# Patient Record
Sex: FEMALE | NOT HISPANIC OR LATINO | Employment: OTHER | URBAN - METROPOLITAN AREA
[De-identification: names, ages, dates, MRNs, and addresses within clinical notes are randomized per-mention and may not be internally consistent; named-entity substitution may affect disease eponyms.]

---

## 2019-06-25 ENCOUNTER — APPOINTMENT (OUTPATIENT)
Dept: PREADMISSION TESTING | Facility: HOSPITAL | Age: 79
End: 2019-06-25

## 2019-06-25 ENCOUNTER — ANESTHESIA EVENT (OUTPATIENT)
Dept: PERIOP | Facility: HOSPITAL | Age: 79
End: 2019-06-25

## 2019-06-25 VITALS — BODY MASS INDEX: 22.46 KG/M2 | WEIGHT: 114.42 LBS | HEIGHT: 60 IN

## 2019-06-25 LAB
DEPRECATED RDW RBC AUTO: 40.4 FL (ref 37–54)
ERYTHROCYTE [DISTWIDTH] IN BLOOD BY AUTOMATED COUNT: 12 % (ref 12.3–15.4)
HBA1C MFR BLD: 5.6 % (ref 4.8–5.6)
HCT VFR BLD AUTO: 38.2 % (ref 34–46.6)
HGB BLD-MCNC: 12.9 G/DL (ref 12–15.9)
MCH RBC QN AUTO: 31.2 PG (ref 26.6–33)
MCHC RBC AUTO-ENTMCNC: 33.8 G/DL (ref 31.5–35.7)
MCV RBC AUTO: 92.3 FL (ref 79–97)
PLATELET # BLD AUTO: 204 10*3/MM3 (ref 140–450)
PMV BLD AUTO: 9.3 FL (ref 6–12)
POTASSIUM BLD-SCNC: 3.7 MMOL/L (ref 3.5–5.2)
RBC # BLD AUTO: 4.14 10*6/MM3 (ref 3.77–5.28)
WBC NRBC COR # BLD: 5.27 10*3/MM3 (ref 3.4–10.8)

## 2019-06-25 PROCEDURE — 84132 ASSAY OF SERUM POTASSIUM: CPT | Performed by: ANESTHESIOLOGY

## 2019-06-25 PROCEDURE — 93005 ELECTROCARDIOGRAM TRACING: CPT

## 2019-06-25 PROCEDURE — 83036 HEMOGLOBIN GLYCOSYLATED A1C: CPT | Performed by: ANESTHESIOLOGY

## 2019-06-25 PROCEDURE — 36415 COLL VENOUS BLD VENIPUNCTURE: CPT

## 2019-06-25 PROCEDURE — 85027 COMPLETE CBC AUTOMATED: CPT | Performed by: ANESTHESIOLOGY

## 2019-06-25 RX ORDER — FAMOTIDINE 10 MG/ML
20 INJECTION, SOLUTION INTRAVENOUS ONCE
Status: CANCELLED | OUTPATIENT
Start: 2019-06-25 | End: 2019-06-25

## 2019-06-25 RX ORDER — LORATADINE 10 MG/1
CAPSULE, LIQUID FILLED ORAL DAILY
COMMUNITY

## 2019-06-25 RX ORDER — GABAPENTIN 300 MG/1
300 CAPSULE ORAL 3 TIMES DAILY
COMMUNITY

## 2019-06-25 RX ORDER — OMEPRAZOLE 20 MG/1
20 CAPSULE, DELAYED RELEASE ORAL DAILY PRN
COMMUNITY

## 2019-06-25 RX ORDER — HYDROCHLOROTHIAZIDE 12.5 MG/1
12.5 TABLET ORAL DAILY
COMMUNITY

## 2019-06-26 ENCOUNTER — APPOINTMENT (OUTPATIENT)
Dept: GENERAL RADIOLOGY | Facility: HOSPITAL | Age: 79
End: 2019-06-26

## 2019-06-26 ENCOUNTER — ANESTHESIA (OUTPATIENT)
Dept: PERIOP | Facility: HOSPITAL | Age: 79
End: 2019-06-26

## 2019-06-26 ENCOUNTER — HOSPITAL ENCOUNTER (INPATIENT)
Facility: HOSPITAL | Age: 79
LOS: 3 days | Discharge: HOME HEALTH | End: 2019-06-29
Attending: ORTHOPAEDIC SURGERY | Admitting: ORTHOPAEDIC SURGERY

## 2019-06-26 DIAGNOSIS — Z74.09 IMPAIRED MOBILITY AND ADLS: ICD-10-CM

## 2019-06-26 DIAGNOSIS — Z74.09 IMPAIRED FUNCTIONAL MOBILITY, BALANCE, GAIT, AND ENDURANCE: Primary | ICD-10-CM

## 2019-06-26 DIAGNOSIS — Z78.9 IMPAIRED MOBILITY AND ADLS: ICD-10-CM

## 2019-06-26 DIAGNOSIS — Z98.1 S/P LUMBAR FUSION: ICD-10-CM

## 2019-06-26 PROBLEM — E78.5 HLD (HYPERLIPIDEMIA): Status: ACTIVE | Noted: 2019-06-26

## 2019-06-26 PROBLEM — I10 HTN (HYPERTENSION): Status: ACTIVE | Noted: 2019-06-26

## 2019-06-26 PROBLEM — M54.9 BACK PAIN: Status: ACTIVE | Noted: 2019-06-26

## 2019-06-26 PROCEDURE — 25010000003 CEFAZOLIN IN DEXTROSE 2-4 GM/100ML-% SOLUTION: Performed by: ORTHOPAEDIC SURGERY

## 2019-06-26 PROCEDURE — 25810000003 SODIUM CHLORIDE 0.9 % WITH KCL 20 MEQ 20-0.9 MEQ/L-% SOLUTION: Performed by: ORTHOPAEDIC SURGERY

## 2019-06-26 PROCEDURE — 25010000002 ONDANSETRON PER 1 MG: Performed by: ORTHOPAEDIC SURGERY

## 2019-06-26 PROCEDURE — C1713 ANCHOR/SCREW BN/BN,TIS/BN: HCPCS | Performed by: ORTHOPAEDIC SURGERY

## 2019-06-26 PROCEDURE — 25010000002 ONDANSETRON PER 1 MG: Performed by: NURSE ANESTHETIST, CERTIFIED REGISTERED

## 2019-06-26 PROCEDURE — 25010000002 NEOSTIGMINE 10 MG/10ML SOLUTION: Performed by: NURSE ANESTHETIST, CERTIFIED REGISTERED

## 2019-06-26 PROCEDURE — 25010000002 FENTANYL CITRATE (PF) 100 MCG/2ML SOLUTION: Performed by: NURSE ANESTHETIST, CERTIFIED REGISTERED

## 2019-06-26 PROCEDURE — 25010000002 PROPOFOL 10 MG/ML EMULSION: Performed by: NURSE ANESTHETIST, CERTIFIED REGISTERED

## 2019-06-26 PROCEDURE — 0SG0071 FUSION OF LUMBAR VERTEBRAL JOINT WITH AUTOLOGOUS TISSUE SUBSTITUTE, POSTERIOR APPROACH, POSTERIOR COLUMN, OPEN APPROACH: ICD-10-PCS | Performed by: ORTHOPAEDIC SURGERY

## 2019-06-26 PROCEDURE — 76000 FLUOROSCOPY <1 HR PHYS/QHP: CPT

## 2019-06-26 PROCEDURE — 25010000002 HYDROMORPHONE 1 MG/ML SOLUTION: Performed by: ORTHOPAEDIC SURGERY

## 2019-06-26 PROCEDURE — 25010000002 PHENYLEPHRINE PER 1 ML: Performed by: NURSE ANESTHETIST, CERTIFIED REGISTERED

## 2019-06-26 PROCEDURE — 07DR3ZZ EXTRACTION OF ILIAC BONE MARROW, PERCUTANEOUS APPROACH: ICD-10-PCS | Performed by: ORTHOPAEDIC SURGERY

## 2019-06-26 PROCEDURE — 0SG00AJ FUSION OF LUMBAR VERTEBRAL JOINT WITH INTERBODY FUSION DEVICE, POSTERIOR APPROACH, ANTERIOR COLUMN, OPEN APPROACH: ICD-10-PCS | Performed by: ORTHOPAEDIC SURGERY

## 2019-06-26 PROCEDURE — 25010000002 DEXAMETHASONE PER 1 MG: Performed by: NURSE ANESTHETIST, CERTIFIED REGISTERED

## 2019-06-26 DEVICE — SCRW VIPER INNR ST: Type: IMPLANTABLE DEVICE | Site: SPINE LUMBAR | Status: FUNCTIONAL

## 2019-06-26 DEVICE — SCRW CORT VIPER PLS5.5 TI FIX 7X40MM: Type: IMPLANTABLE DEVICE | Site: SPINE LUMBAR | Status: FUNCTIONAL

## 2019-06-26 DEVICE — ROD PREBNT SPINE EXPEDIUM TI 5.5X40MM: Type: IMPLANTABLE DEVICE | Site: SPINE LUMBAR | Status: FUNCTIONAL

## 2019-06-26 DEVICE — IMPLANTABLE DEVICE: Type: IMPLANTABLE DEVICE | Site: SPINE LUMBAR | Status: FUNCTIONAL

## 2019-06-26 DEVICE — ALLOGRFT BONE VIVIGEN CELLUAR MATRX FORMABLE 5CC: Type: IMPLANTABLE DEVICE | Site: SPINE LUMBAR | Status: FUNCTIONAL

## 2019-06-26 DEVICE — SCRW CORT VIPER PLS5.5 TI FIX 6X40MM: Type: IMPLANTABLE DEVICE | Site: SPINE LUMBAR | Status: FUNCTIONAL

## 2019-06-26 RX ORDER — NALOXONE HCL 0.4 MG/ML
0.4 VIAL (ML) INJECTION
Status: DISCONTINUED | OUTPATIENT
Start: 2019-06-26 | End: 2019-06-29 | Stop reason: HOSPADM

## 2019-06-26 RX ORDER — PROPOFOL 10 MG/ML
VIAL (ML) INTRAVENOUS CONTINUOUS PRN
Status: DISCONTINUED | OUTPATIENT
Start: 2019-06-26 | End: 2019-06-26 | Stop reason: SURG

## 2019-06-26 RX ORDER — ONDANSETRON 2 MG/ML
4 INJECTION INTRAMUSCULAR; INTRAVENOUS EVERY 6 HOURS PRN
Status: DISCONTINUED | OUTPATIENT
Start: 2019-06-26 | End: 2019-06-29 | Stop reason: HOSPADM

## 2019-06-26 RX ORDER — ACETAMINOPHEN 325 MG/1
650 TABLET ORAL EVERY 4 HOURS PRN
Status: DISCONTINUED | OUTPATIENT
Start: 2019-06-26 | End: 2019-06-29 | Stop reason: HOSPADM

## 2019-06-26 RX ORDER — MORPHINE SULFATE 2 MG/ML
1 INJECTION, SOLUTION INTRAMUSCULAR; INTRAVENOUS EVERY 4 HOURS PRN
Status: DISCONTINUED | OUTPATIENT
Start: 2019-06-26 | End: 2019-06-29 | Stop reason: HOSPADM

## 2019-06-26 RX ORDER — PANTOPRAZOLE SODIUM 40 MG/1
40 TABLET, DELAYED RELEASE ORAL
Status: DISCONTINUED | OUTPATIENT
Start: 2019-06-27 | End: 2019-06-29 | Stop reason: HOSPADM

## 2019-06-26 RX ORDER — CEFAZOLIN SODIUM 2 G/100ML
2 INJECTION, SOLUTION INTRAVENOUS ONCE
Status: COMPLETED | OUTPATIENT
Start: 2019-06-26 | End: 2019-06-26

## 2019-06-26 RX ORDER — HYDROMORPHONE HYDROCHLORIDE 1 MG/ML
0.5 INJECTION, SOLUTION INTRAMUSCULAR; INTRAVENOUS; SUBCUTANEOUS
Status: DISCONTINUED | OUTPATIENT
Start: 2019-06-26 | End: 2019-06-26 | Stop reason: HOSPADM

## 2019-06-26 RX ORDER — SODIUM CHLORIDE 0.9 % (FLUSH) 0.9 %
3 SYRINGE (ML) INJECTION EVERY 12 HOURS SCHEDULED
Status: DISCONTINUED | OUTPATIENT
Start: 2019-06-26 | End: 2019-06-26 | Stop reason: HOSPADM

## 2019-06-26 RX ORDER — SODIUM CHLORIDE 0.9 % (FLUSH) 0.9 %
3-10 SYRINGE (ML) INJECTION AS NEEDED
Status: DISCONTINUED | OUTPATIENT
Start: 2019-06-26 | End: 2019-06-29 | Stop reason: HOSPADM

## 2019-06-26 RX ORDER — CEFAZOLIN SODIUM 2 G/100ML
2 INJECTION, SOLUTION INTRAVENOUS EVERY 8 HOURS
Status: COMPLETED | OUTPATIENT
Start: 2019-06-26 | End: 2019-06-26

## 2019-06-26 RX ORDER — BISACODYL 10 MG
10 SUPPOSITORY, RECTAL RECTAL DAILY PRN
Status: DISCONTINUED | OUTPATIENT
Start: 2019-06-26 | End: 2019-06-29 | Stop reason: HOSPADM

## 2019-06-26 RX ORDER — PREGABALIN 75 MG/1
75 CAPSULE ORAL ONCE
Status: COMPLETED | OUTPATIENT
Start: 2019-06-26 | End: 2019-06-26

## 2019-06-26 RX ORDER — ACETAMINOPHEN 500 MG
1000 TABLET ORAL ONCE
Status: COMPLETED | OUTPATIENT
Start: 2019-06-26 | End: 2019-06-26

## 2019-06-26 RX ORDER — OXYCODONE HCL 10 MG/1
10 TABLET, FILM COATED, EXTENDED RELEASE ORAL ONCE
Status: COMPLETED | OUTPATIENT
Start: 2019-06-26 | End: 2019-06-26

## 2019-06-26 RX ORDER — SODIUM CHLORIDE 0.9 % (FLUSH) 0.9 %
3 SYRINGE (ML) INJECTION EVERY 12 HOURS SCHEDULED
Status: DISCONTINUED | OUTPATIENT
Start: 2019-06-26 | End: 2019-06-29 | Stop reason: HOSPADM

## 2019-06-26 RX ORDER — SODIUM CHLORIDE AND POTASSIUM CHLORIDE 150; 900 MG/100ML; MG/100ML
100 INJECTION, SOLUTION INTRAVENOUS CONTINUOUS
Status: DISCONTINUED | OUTPATIENT
Start: 2019-06-26 | End: 2019-06-29 | Stop reason: HOSPADM

## 2019-06-26 RX ORDER — FENTANYL CITRATE 50 UG/ML
50 INJECTION, SOLUTION INTRAMUSCULAR; INTRAVENOUS
Status: DISCONTINUED | OUTPATIENT
Start: 2019-06-26 | End: 2019-06-26 | Stop reason: HOSPADM

## 2019-06-26 RX ORDER — FAMOTIDINE 20 MG/1
20 TABLET, FILM COATED ORAL EVERY 12 HOURS SCHEDULED
Status: DISCONTINUED | OUTPATIENT
Start: 2019-06-26 | End: 2019-06-26

## 2019-06-26 RX ORDER — LIDOCAINE HYDROCHLORIDE 10 MG/ML
INJECTION, SOLUTION EPIDURAL; INFILTRATION; INTRACAUDAL; PERINEURAL AS NEEDED
Status: DISCONTINUED | OUTPATIENT
Start: 2019-06-26 | End: 2019-06-26 | Stop reason: SURG

## 2019-06-26 RX ORDER — GLYCOPYRROLATE 0.2 MG/ML
INJECTION INTRAMUSCULAR; INTRAVENOUS AS NEEDED
Status: DISCONTINUED | OUTPATIENT
Start: 2019-06-26 | End: 2019-06-26 | Stop reason: SURG

## 2019-06-26 RX ORDER — GABAPENTIN 300 MG/1
300 CAPSULE ORAL 3 TIMES DAILY
Status: DISCONTINUED | OUTPATIENT
Start: 2019-06-26 | End: 2019-06-29 | Stop reason: HOSPADM

## 2019-06-26 RX ORDER — PROPOFOL 10 MG/ML
VIAL (ML) INTRAVENOUS AS NEEDED
Status: DISCONTINUED | OUTPATIENT
Start: 2019-06-26 | End: 2019-06-26 | Stop reason: SURG

## 2019-06-26 RX ORDER — OXYCODONE AND ACETAMINOPHEN 10; 325 MG/1; MG/1
1 TABLET ORAL EVERY 4 HOURS PRN
Status: DISCONTINUED | OUTPATIENT
Start: 2019-06-26 | End: 2019-06-29 | Stop reason: HOSPADM

## 2019-06-26 RX ORDER — PHENOL 1.4 %
1200 AEROSOL, SPRAY (ML) MUCOUS MEMBRANE DAILY
COMMUNITY

## 2019-06-26 RX ORDER — ATORVASTATIN CALCIUM 10 MG/1
10 TABLET, FILM COATED ORAL NIGHTLY
Status: DISCONTINUED | OUTPATIENT
Start: 2019-06-26 | End: 2019-06-29 | Stop reason: HOSPADM

## 2019-06-26 RX ORDER — BISACODYL 5 MG/1
5 TABLET, DELAYED RELEASE ORAL DAILY PRN
Status: DISCONTINUED | OUTPATIENT
Start: 2019-06-26 | End: 2019-06-29 | Stop reason: HOSPADM

## 2019-06-26 RX ORDER — BUPIVACAINE HYDROCHLORIDE AND EPINEPHRINE 2.5; 5 MG/ML; UG/ML
INJECTION, SOLUTION INFILTRATION; PERINEURAL AS NEEDED
Status: DISCONTINUED | OUTPATIENT
Start: 2019-06-26 | End: 2019-06-26 | Stop reason: HOSPADM

## 2019-06-26 RX ORDER — LIDOCAINE HYDROCHLORIDE 10 MG/ML
0.5 INJECTION, SOLUTION EPIDURAL; INFILTRATION; INTRACAUDAL; PERINEURAL ONCE AS NEEDED
Status: COMPLETED | OUTPATIENT
Start: 2019-06-26 | End: 2019-06-26

## 2019-06-26 RX ORDER — SODIUM CHLORIDE, SODIUM LACTATE, POTASSIUM CHLORIDE, CALCIUM CHLORIDE 600; 310; 30; 20 MG/100ML; MG/100ML; MG/100ML; MG/100ML
9 INJECTION, SOLUTION INTRAVENOUS CONTINUOUS
Status: DISCONTINUED | OUTPATIENT
Start: 2019-06-26 | End: 2019-06-29 | Stop reason: HOSPADM

## 2019-06-26 RX ORDER — ROCURONIUM BROMIDE 10 MG/ML
INJECTION, SOLUTION INTRAVENOUS AS NEEDED
Status: DISCONTINUED | OUTPATIENT
Start: 2019-06-26 | End: 2019-06-26 | Stop reason: SURG

## 2019-06-26 RX ORDER — CETIRIZINE HYDROCHLORIDE 10 MG/1
10 TABLET ORAL DAILY
Status: DISCONTINUED | OUTPATIENT
Start: 2019-06-26 | End: 2019-06-29 | Stop reason: HOSPADM

## 2019-06-26 RX ORDER — NEOSTIGMINE METHYLSULFATE 1 MG/ML
INJECTION, SOLUTION INTRAVENOUS AS NEEDED
Status: DISCONTINUED | OUTPATIENT
Start: 2019-06-26 | End: 2019-06-26 | Stop reason: SURG

## 2019-06-26 RX ORDER — ONDANSETRON 2 MG/ML
INJECTION INTRAMUSCULAR; INTRAVENOUS AS NEEDED
Status: DISCONTINUED | OUTPATIENT
Start: 2019-06-26 | End: 2019-06-26 | Stop reason: SURG

## 2019-06-26 RX ORDER — DEXAMETHASONE SODIUM PHOSPHATE 4 MG/ML
INJECTION, SOLUTION INTRA-ARTICULAR; INTRALESIONAL; INTRAMUSCULAR; INTRAVENOUS; SOFT TISSUE AS NEEDED
Status: DISCONTINUED | OUTPATIENT
Start: 2019-06-26 | End: 2019-06-26 | Stop reason: SURG

## 2019-06-26 RX ORDER — FAMOTIDINE 10 MG/ML
20 INJECTION, SOLUTION INTRAVENOUS EVERY 12 HOURS SCHEDULED
Status: DISCONTINUED | OUTPATIENT
Start: 2019-06-26 | End: 2019-06-26

## 2019-06-26 RX ORDER — LABETALOL HYDROCHLORIDE 5 MG/ML
10 INJECTION, SOLUTION INTRAVENOUS EVERY 4 HOURS PRN
Status: DISCONTINUED | OUTPATIENT
Start: 2019-06-26 | End: 2019-06-29 | Stop reason: HOSPADM

## 2019-06-26 RX ORDER — FENTANYL CITRATE 50 UG/ML
INJECTION, SOLUTION INTRAMUSCULAR; INTRAVENOUS AS NEEDED
Status: DISCONTINUED | OUTPATIENT
Start: 2019-06-26 | End: 2019-06-26 | Stop reason: SURG

## 2019-06-26 RX ORDER — SODIUM CHLORIDE 0.9 % (FLUSH) 0.9 %
3-10 SYRINGE (ML) INJECTION AS NEEDED
Status: DISCONTINUED | OUTPATIENT
Start: 2019-06-26 | End: 2019-06-26 | Stop reason: HOSPADM

## 2019-06-26 RX ORDER — ACETAMINOPHEN 500 MG
1000 TABLET ORAL 3 TIMES DAILY PRN
COMMUNITY

## 2019-06-26 RX ORDER — FAMOTIDINE 20 MG/1
20 TABLET, FILM COATED ORAL ONCE
Status: COMPLETED | OUTPATIENT
Start: 2019-06-26 | End: 2019-06-26

## 2019-06-26 RX ADMIN — DEXAMETHASONE SODIUM PHOSPHATE 8 MG: 4 INJECTION, SOLUTION INTRAMUSCULAR; INTRAVENOUS at 08:10

## 2019-06-26 RX ADMIN — PREGABALIN 75 MG: 75 CAPSULE ORAL at 07:08

## 2019-06-26 RX ADMIN — FENTANYL CITRATE 50 MCG: 50 INJECTION INTRAMUSCULAR; INTRAVENOUS at 11:17

## 2019-06-26 RX ADMIN — FENTANYL CITRATE 50 MCG: 50 INJECTION INTRAMUSCULAR; INTRAVENOUS at 12:15

## 2019-06-26 RX ADMIN — CEFAZOLIN SODIUM 2 G: 2 INJECTION, SOLUTION INTRAVENOUS at 16:15

## 2019-06-26 RX ADMIN — ONDANSETRON 4 MG: 2 INJECTION INTRAMUSCULAR; INTRAVENOUS at 14:18

## 2019-06-26 RX ADMIN — PROPOFOL 25 MCG/KG/MIN: 10 INJECTION, EMULSION INTRAVENOUS at 08:05

## 2019-06-26 RX ADMIN — PHENYLEPHRINE HYDROCHLORIDE 80 MCG: 10 INJECTION INTRAVENOUS at 09:54

## 2019-06-26 RX ADMIN — ROCURONIUM BROMIDE 50 MG: 10 INJECTION INTRAVENOUS at 08:01

## 2019-06-26 RX ADMIN — EPHEDRINE SULFATE 10 MG: 50 INJECTION INTRAMUSCULAR; INTRAVENOUS; SUBCUTANEOUS at 08:53

## 2019-06-26 RX ADMIN — FENTANYL CITRATE 50 MCG: 50 INJECTION, SOLUTION INTRAMUSCULAR; INTRAVENOUS at 08:59

## 2019-06-26 RX ADMIN — ACETAMINOPHEN 1000 MG: 500 TABLET ORAL at 07:08

## 2019-06-26 RX ADMIN — PHENYLEPHRINE HYDROCHLORIDE 80 MCG: 10 INJECTION INTRAVENOUS at 10:08

## 2019-06-26 RX ADMIN — SODIUM CHLORIDE, POTASSIUM CHLORIDE, SODIUM LACTATE AND CALCIUM CHLORIDE 9 ML/HR: 600; 310; 30; 20 INJECTION, SOLUTION INTRAVENOUS at 07:10

## 2019-06-26 RX ADMIN — SODIUM CHLORIDE, POTASSIUM CHLORIDE, SODIUM LACTATE AND CALCIUM CHLORIDE: 600; 310; 30; 20 INJECTION, SOLUTION INTRAVENOUS at 09:50

## 2019-06-26 RX ADMIN — PHENYLEPHRINE HYDROCHLORIDE 80 MCG: 10 INJECTION INTRAVENOUS at 08:12

## 2019-06-26 RX ADMIN — CEFAZOLIN SODIUM 2 G: 2 INJECTION, SOLUTION INTRAVENOUS at 21:21

## 2019-06-26 RX ADMIN — GLYCOPYRROLATE 0.4 MG: 0.2 INJECTION, SOLUTION INTRAMUSCULAR; INTRAVENOUS at 10:28

## 2019-06-26 RX ADMIN — GABAPENTIN 300 MG: 300 CAPSULE ORAL at 20:19

## 2019-06-26 RX ADMIN — FENTANYL CITRATE 50 MCG: 50 INJECTION, SOLUTION INTRAMUSCULAR; INTRAVENOUS at 08:01

## 2019-06-26 RX ADMIN — CEFAZOLIN SODIUM 2 G: 2 INJECTION, SOLUTION INTRAVENOUS at 07:57

## 2019-06-26 RX ADMIN — PHENYLEPHRINE HYDROCHLORIDE 80 MCG: 10 INJECTION INTRAVENOUS at 09:11

## 2019-06-26 RX ADMIN — MUPIROCIN 1 APPLICATION: 20 OINTMENT TOPICAL at 07:09

## 2019-06-26 RX ADMIN — FAMOTIDINE 20 MG: 20 TABLET, FILM COATED ORAL at 07:09

## 2019-06-26 RX ADMIN — FENTANYL CITRATE 50 MCG: 50 INJECTION INTRAMUSCULAR; INTRAVENOUS at 12:30

## 2019-06-26 RX ADMIN — LIDOCAINE HYDROCHLORIDE 50 MG: 10 INJECTION, SOLUTION EPIDURAL; INFILTRATION; INTRACAUDAL; PERINEURAL at 08:01

## 2019-06-26 RX ADMIN — PROPOFOL 150 MG: 10 INJECTION, EMULSION INTRAVENOUS at 08:01

## 2019-06-26 RX ADMIN — PHENYLEPHRINE HYDROCHLORIDE 80 MCG: 10 INJECTION INTRAVENOUS at 10:22

## 2019-06-26 RX ADMIN — ONDANSETRON 4 MG: 2 INJECTION INTRAMUSCULAR; INTRAVENOUS at 10:34

## 2019-06-26 RX ADMIN — OXYCODONE HYDROCHLORIDE 10 MG: 10 TABLET, FILM COATED, EXTENDED RELEASE ORAL at 07:09

## 2019-06-26 RX ADMIN — FENTANYL CITRATE 50 MCG: 50 INJECTION INTRAMUSCULAR; INTRAVENOUS at 12:25

## 2019-06-26 RX ADMIN — PHENYLEPHRINE HYDROCHLORIDE 80 MCG: 10 INJECTION INTRAVENOUS at 08:53

## 2019-06-26 RX ADMIN — ATORVASTATIN CALCIUM 10 MG: 10 TABLET, FILM COATED ORAL at 20:20

## 2019-06-26 RX ADMIN — SODIUM CHLORIDE, PRESERVATIVE FREE 3 ML: 5 INJECTION INTRAVENOUS at 21:23

## 2019-06-26 RX ADMIN — EPHEDRINE SULFATE 10 MG: 50 INJECTION INTRAMUSCULAR; INTRAVENOUS; SUBCUTANEOUS at 08:32

## 2019-06-26 RX ADMIN — HYDROMORPHONE HYDROCHLORIDE 1 MG: 1 INJECTION, SOLUTION INTRAMUSCULAR; INTRAVENOUS; SUBCUTANEOUS at 14:16

## 2019-06-26 RX ADMIN — PHENYLEPHRINE HYDROCHLORIDE 80 MCG: 10 INJECTION INTRAVENOUS at 10:40

## 2019-06-26 RX ADMIN — EPHEDRINE SULFATE 10 MG: 50 INJECTION INTRAMUSCULAR; INTRAVENOUS; SUBCUTANEOUS at 08:17

## 2019-06-26 RX ADMIN — LIDOCAINE HYDROCHLORIDE 0.5 ML: 10 INJECTION, SOLUTION EPIDURAL; INFILTRATION; INTRACAUDAL; PERINEURAL at 07:08

## 2019-06-26 RX ADMIN — NEOSTIGMINE METHYLSULFATE 3 MG: 1 INJECTION, SOLUTION INTRAVENOUS at 10:28

## 2019-06-26 RX ADMIN — POTASSIUM CHLORIDE AND SODIUM CHLORIDE 100 ML/HR: 900; 150 INJECTION, SOLUTION INTRAVENOUS at 16:15

## 2019-06-26 NOTE — ANESTHESIA PREPROCEDURE EVALUATION
Anesthesia Evaluation     history of anesthetic complications: PONV               Airway   Mallampati: I  TM distance: >3 FB  Neck ROM: full  No difficulty expected  Dental      Pulmonary    Cardiovascular     ECG reviewed    (+) hypertension, hyperlipidemia,       Neuro/Psych  GI/Hepatic/Renal/Endo    (+)  GERD,      Musculoskeletal     Abdominal    Substance History      OB/GYN          Other                        Anesthesia Plan    ASA 2     general     intravenous induction   Anesthetic plan, all risks, benefits, and alternatives have been provided, discussed and informed consent has been obtained with: patient.    Plan discussed with CRNA.

## 2019-06-26 NOTE — ANESTHESIA PROCEDURE NOTES
Airway  Urgency: elective    Airway not difficult    General Information and Staff    Patient location during procedure: OR  CRNA: Edward Del Rio CRNA    Indications and Patient Condition  Indications for airway management: airway protection    Preoxygenated: yes  MILS not maintained throughout  Mask difficulty assessment: 1 - vent by mask    Final Airway Details  Final airway type: endotracheal airway      Successful airway: ETT  Cuffed: yes   Successful intubation technique: direct laryngoscopy  Facilitating devices/methods: intubating stylet  Endotracheal tube insertion site: oral  Blade: Allen  Blade size: 2  ETT size (mm): 7.0  Cormack-Lehane Classification: grade I - full view of glottis  Placement verified by: chest auscultation and capnometry   Measured from: lips  ETT to lips (cm): 20  Number of attempts at approach: 1    Additional Comments  Negative epigastric sounds, Breath sound equal bilaterally with symmetric chest rise and fall

## 2019-06-26 NOTE — ANESTHESIA POSTPROCEDURE EVALUATION
"Patient: Martine Win    Procedure Summary     Date:  06/26/19 Room / Location:   [facility] [address] [place] [address] [facility]    Anesthesia Start:  0757 Anesthesia Stop:      Procedure:  DECOMPRESSION AND FUSION WITH INSTRUMENTATION L4-5 (N/A Spine Lumbar) Diagnosis:      Surgeon:  Gilbert Narayanan MD Provider:  Mauro Richard MD    Anesthesia Type:  general ASA Status:  2          Anesthesia Type: general  Last vitals  BP   95/51   Temp   97.4   Pulse   99   Resp   16     SpO2   100     Post Anesthesia Care and Evaluation    Patient location during evaluation: PACU  Patient participation: complete - patient participated  Level of consciousness: awake and responsive to verbal stimuli  Pain score: 2  Pain management: adequate  Airway patency: patent  Anesthetic complications: No anesthetic complications    Cardiovascular status: acceptable  Respiratory status: acceptable  Hydration status: acceptable    Comments: Pt awake and responsive. SV. VSS. Report to RN. Patient Vitals in the past 24 hrs:  06/26/19 0737, BP:150/73, Temp:98.2 °F (36.8 °C), Temp src:Temporal, Pulse:74, Resp:16, SpO2:100 %, Height:152.4 cm (60\"), Weight:51.9 kg (114 lb 6.7 oz)  133/78. p 72. r 16. t 98.1                  "

## 2019-06-27 PROBLEM — G89.18 ACUTE POSTOPERATIVE PAIN: Status: ACTIVE | Noted: 2019-06-27

## 2019-06-27 PROBLEM — K59.00 CONSTIPATION: Status: ACTIVE | Noted: 2019-06-27

## 2019-06-27 PROBLEM — N99.89 POSTOPERATIVE URINARY RETENTION: Status: ACTIVE | Noted: 2019-06-27

## 2019-06-27 PROBLEM — R33.8 POSTOPERATIVE URINARY RETENTION: Status: ACTIVE | Noted: 2019-06-27

## 2019-06-27 PROBLEM — D62 ACUTE BLOOD LOSS ANEMIA: Status: ACTIVE | Noted: 2019-06-27

## 2019-06-27 LAB
ANION GAP SERPL CALCULATED.3IONS-SCNC: 13 MMOL/L (ref 5–15)
BUN BLD-MCNC: 9 MG/DL (ref 8–23)
BUN/CREAT SERPL: 15.3 (ref 7–25)
CALCIUM SPEC-SCNC: 8.3 MG/DL (ref 8.6–10.5)
CHLORIDE SERPL-SCNC: 101 MMOL/L (ref 98–107)
CO2 SERPL-SCNC: 24 MMOL/L (ref 22–29)
CREAT BLD-MCNC: 0.59 MG/DL (ref 0.57–1)
DEPRECATED RDW RBC AUTO: 41.9 FL (ref 37–54)
ERYTHROCYTE [DISTWIDTH] IN BLOOD BY AUTOMATED COUNT: 12.4 % (ref 12.3–15.4)
GFR SERPL CREATININE-BSD FRML MDRD: 120 ML/MIN/1.73
GFR SERPL CREATININE-BSD FRML MDRD: 99 ML/MIN/1.73
GLUCOSE BLD-MCNC: 127 MG/DL (ref 65–99)
HCT VFR BLD AUTO: 27 % (ref 34–46.6)
HGB BLD-MCNC: 9 G/DL (ref 12–15.9)
MCH RBC QN AUTO: 31 PG (ref 26.6–33)
MCHC RBC AUTO-ENTMCNC: 33.3 G/DL (ref 31.5–35.7)
MCV RBC AUTO: 93.1 FL (ref 79–97)
PLATELET # BLD AUTO: 157 10*3/MM3 (ref 140–450)
PMV BLD AUTO: 10 FL (ref 6–12)
POTASSIUM BLD-SCNC: 4.2 MMOL/L (ref 3.5–5.2)
RBC # BLD AUTO: 2.9 10*6/MM3 (ref 3.77–5.28)
SODIUM BLD-SCNC: 138 MMOL/L (ref 136–145)
WBC NRBC COR # BLD: 10.58 10*3/MM3 (ref 3.4–10.8)

## 2019-06-27 PROCEDURE — 80048 BASIC METABOLIC PNL TOTAL CA: CPT | Performed by: NURSE PRACTITIONER

## 2019-06-27 PROCEDURE — 97165 OT EVAL LOW COMPLEX 30 MIN: CPT | Performed by: OCCUPATIONAL THERAPIST

## 2019-06-27 PROCEDURE — 25810000003 SODIUM CHLORIDE 0.9 % WITH KCL 20 MEQ 20-0.9 MEQ/L-% SOLUTION: Performed by: ORTHOPAEDIC SURGERY

## 2019-06-27 PROCEDURE — 97161 PT EVAL LOW COMPLEX 20 MIN: CPT

## 2019-06-27 PROCEDURE — 85027 COMPLETE CBC AUTOMATED: CPT | Performed by: NURSE PRACTITIONER

## 2019-06-27 PROCEDURE — 97116 GAIT TRAINING THERAPY: CPT

## 2019-06-27 PROCEDURE — 97535 SELF CARE MNGMENT TRAINING: CPT | Performed by: OCCUPATIONAL THERAPIST

## 2019-06-27 PROCEDURE — P9612 CATHETERIZE FOR URINE SPEC: HCPCS

## 2019-06-27 RX ORDER — TAMSULOSIN HYDROCHLORIDE 0.4 MG/1
0.4 CAPSULE ORAL DAILY
Status: DISCONTINUED | OUTPATIENT
Start: 2019-06-27 | End: 2019-06-29 | Stop reason: HOSPADM

## 2019-06-27 RX ADMIN — SODIUM CHLORIDE 500 ML: 9 INJECTION, SOLUTION INTRAVENOUS at 11:04

## 2019-06-27 RX ADMIN — POTASSIUM CHLORIDE AND SODIUM CHLORIDE 100 ML/HR: 900; 150 INJECTION, SOLUTION INTRAVENOUS at 17:59

## 2019-06-27 RX ADMIN — OXYCODONE HYDROCHLORIDE AND ACETAMINOPHEN 1 TABLET: 10; 325 TABLET ORAL at 03:59

## 2019-06-27 RX ADMIN — GABAPENTIN 300 MG: 300 CAPSULE ORAL at 15:53

## 2019-06-27 RX ADMIN — BISACODYL 10 MG: 10 SUPPOSITORY RECTAL at 21:35

## 2019-06-27 RX ADMIN — CETIRIZINE HYDROCHLORIDE 10 MG: 10 TABLET, FILM COATED ORAL at 08:28

## 2019-06-27 RX ADMIN — PANTOPRAZOLE SODIUM 40 MG: 40 TABLET, DELAYED RELEASE ORAL at 05:19

## 2019-06-27 RX ADMIN — TAMSULOSIN HYDROCHLORIDE 0.4 MG: 0.4 CAPSULE ORAL at 17:59

## 2019-06-27 RX ADMIN — MAGNESIUM HYDROXIDE 10 ML: 2400 SUSPENSION ORAL at 11:05

## 2019-06-27 RX ADMIN — BISACODYL 5 MG: 5 TABLET, COATED ORAL at 15:53

## 2019-06-27 RX ADMIN — OXYCODONE HYDROCHLORIDE AND ACETAMINOPHEN 1 TABLET: 10; 325 TABLET ORAL at 18:07

## 2019-06-27 RX ADMIN — GABAPENTIN 300 MG: 300 CAPSULE ORAL at 20:53

## 2019-06-27 RX ADMIN — SODIUM CHLORIDE, PRESERVATIVE FREE 3 ML: 5 INJECTION INTRAVENOUS at 20:54

## 2019-06-27 RX ADMIN — ATORVASTATIN CALCIUM 10 MG: 10 TABLET, FILM COATED ORAL at 20:53

## 2019-06-27 RX ADMIN — POTASSIUM CHLORIDE AND SODIUM CHLORIDE 100 ML/HR: 900; 150 INJECTION, SOLUTION INTRAVENOUS at 05:37

## 2019-06-27 RX ADMIN — GABAPENTIN 300 MG: 300 CAPSULE ORAL at 08:28

## 2019-06-28 PROBLEM — R55 SYNCOPE: Status: ACTIVE | Noted: 2019-06-28

## 2019-06-28 LAB
BASOPHILS # BLD AUTO: 0.02 10*3/MM3 (ref 0–0.2)
BASOPHILS NFR BLD AUTO: 0.2 % (ref 0–1.5)
DEPRECATED RDW RBC AUTO: 46.2 FL (ref 37–54)
EOSINOPHIL # BLD AUTO: 0.02 10*3/MM3 (ref 0–0.4)
EOSINOPHIL NFR BLD AUTO: 0.2 % (ref 0.3–6.2)
ERYTHROCYTE [DISTWIDTH] IN BLOOD BY AUTOMATED COUNT: 12.9 % (ref 12.3–15.4)
GLUCOSE BLDC GLUCOMTR-MCNC: 144 MG/DL (ref 70–130)
HCT VFR BLD AUTO: 25.7 % (ref 34–46.6)
HGB BLD-MCNC: 8.3 G/DL (ref 12–15.9)
IMM GRANULOCYTES # BLD AUTO: 0.07 10*3/MM3 (ref 0–0.05)
IMM GRANULOCYTES NFR BLD AUTO: 0.6 % (ref 0–0.5)
LYMPHOCYTES # BLD AUTO: 0.81 10*3/MM3 (ref 0.7–3.1)
LYMPHOCYTES NFR BLD AUTO: 7.5 % (ref 19.6–45.3)
MCH RBC QN AUTO: 31.7 PG (ref 26.6–33)
MCHC RBC AUTO-ENTMCNC: 32.3 G/DL (ref 31.5–35.7)
MCV RBC AUTO: 98.1 FL (ref 79–97)
MONOCYTES # BLD AUTO: 0.91 10*3/MM3 (ref 0.1–0.9)
MONOCYTES NFR BLD AUTO: 8.4 % (ref 5–12)
NEUTROPHILS # BLD AUTO: 8.94 10*3/MM3 (ref 1.7–7)
NEUTROPHILS NFR BLD AUTO: 83.1 % (ref 42.7–76)
NRBC BLD AUTO-RTO: 0 /100 WBC (ref 0–0.2)
PLATELET # BLD AUTO: 136 10*3/MM3 (ref 140–450)
PMV BLD AUTO: 9.9 FL (ref 6–12)
RBC # BLD AUTO: 2.62 10*6/MM3 (ref 3.77–5.28)
WBC NRBC COR # BLD: 10.77 10*3/MM3 (ref 3.4–10.8)

## 2019-06-28 PROCEDURE — 25810000003 SODIUM CHLORIDE 0.9 % WITH KCL 20 MEQ 20-0.9 MEQ/L-% SOLUTION: Performed by: ORTHOPAEDIC SURGERY

## 2019-06-28 PROCEDURE — 85025 COMPLETE CBC W/AUTO DIFF WBC: CPT | Performed by: NURSE PRACTITIONER

## 2019-06-28 PROCEDURE — 97110 THERAPEUTIC EXERCISES: CPT

## 2019-06-28 PROCEDURE — 82962 GLUCOSE BLOOD TEST: CPT

## 2019-06-28 PROCEDURE — 97116 GAIT TRAINING THERAPY: CPT

## 2019-06-28 RX ADMIN — GABAPENTIN 300 MG: 300 CAPSULE ORAL at 08:26

## 2019-06-28 RX ADMIN — POTASSIUM CHLORIDE AND SODIUM CHLORIDE 100 ML/HR: 900; 150 INJECTION, SOLUTION INTRAVENOUS at 21:02

## 2019-06-28 RX ADMIN — TAMSULOSIN HYDROCHLORIDE 0.4 MG: 0.4 CAPSULE ORAL at 08:26

## 2019-06-28 RX ADMIN — GABAPENTIN 300 MG: 300 CAPSULE ORAL at 16:00

## 2019-06-28 RX ADMIN — ACETAMINOPHEN 650 MG: 325 TABLET, FILM COATED ORAL at 21:03

## 2019-06-28 RX ADMIN — BISACODYL 5 MG: 5 TABLET, COATED ORAL at 21:02

## 2019-06-28 RX ADMIN — PANTOPRAZOLE SODIUM 40 MG: 40 TABLET, DELAYED RELEASE ORAL at 05:05

## 2019-06-28 RX ADMIN — SODIUM CHLORIDE, PRESERVATIVE FREE 3 ML: 5 INJECTION INTRAVENOUS at 12:07

## 2019-06-28 RX ADMIN — SODIUM CHLORIDE 500 ML: 9 INJECTION, SOLUTION INTRAVENOUS at 04:11

## 2019-06-28 RX ADMIN — CETIRIZINE HYDROCHLORIDE 10 MG: 10 TABLET, FILM COATED ORAL at 08:26

## 2019-06-28 RX ADMIN — GABAPENTIN 300 MG: 300 CAPSULE ORAL at 21:02

## 2019-06-28 RX ADMIN — ATORVASTATIN CALCIUM 10 MG: 10 TABLET, FILM COATED ORAL at 21:02

## 2019-06-28 RX ADMIN — SODIUM CHLORIDE, PRESERVATIVE FREE 3 ML: 5 INJECTION INTRAVENOUS at 21:03

## 2019-06-29 VITALS
WEIGHT: 114.42 LBS | HEART RATE: 69 BPM | HEIGHT: 60 IN | BODY MASS INDEX: 22.46 KG/M2 | TEMPERATURE: 98.2 F | RESPIRATION RATE: 16 BRPM | DIASTOLIC BLOOD PRESSURE: 67 MMHG | SYSTOLIC BLOOD PRESSURE: 136 MMHG | OXYGEN SATURATION: 94 %

## 2019-06-29 PROCEDURE — 97116 GAIT TRAINING THERAPY: CPT

## 2019-06-29 PROCEDURE — 25810000003 SODIUM CHLORIDE 0.9 % WITH KCL 20 MEQ 20-0.9 MEQ/L-% SOLUTION: Performed by: ORTHOPAEDIC SURGERY

## 2019-06-29 RX ORDER — HYDROCODONE BITARTRATE AND ACETAMINOPHEN 5; 325 MG/1; MG/1
1 TABLET ORAL EVERY 6 HOURS PRN
Qty: 40 TABLET | Refills: 0
Start: 2019-06-29

## 2019-06-29 RX ORDER — DOCUSATE SODIUM 100 MG/1
100 CAPSULE, LIQUID FILLED ORAL 2 TIMES DAILY
Qty: 60 CAPSULE | Refills: 0 | Status: SHIPPED | OUTPATIENT
Start: 2019-06-29

## 2019-06-29 RX ADMIN — CETIRIZINE HYDROCHLORIDE 10 MG: 10 TABLET, FILM COATED ORAL at 08:38

## 2019-06-29 RX ADMIN — POTASSIUM CHLORIDE AND SODIUM CHLORIDE 100 ML/HR: 900; 150 INJECTION, SOLUTION INTRAVENOUS at 05:56

## 2019-06-29 RX ADMIN — TAMSULOSIN HYDROCHLORIDE 0.4 MG: 0.4 CAPSULE ORAL at 08:38

## 2019-06-29 RX ADMIN — PANTOPRAZOLE SODIUM 40 MG: 40 TABLET, DELAYED RELEASE ORAL at 05:56

## 2019-06-29 RX ADMIN — OXYCODONE HYDROCHLORIDE AND ACETAMINOPHEN 1 TABLET: 10; 325 TABLET ORAL at 08:38

## 2019-06-29 RX ADMIN — GABAPENTIN 300 MG: 300 CAPSULE ORAL at 08:38

## 2019-06-29 RX ADMIN — BISACODYL 10 MG: 10 SUPPOSITORY RECTAL at 08:38

## 2024-06-04 ENCOUNTER — HOSPITAL ENCOUNTER (EMERGENCY)
Facility: HOSPITAL | Age: 84
Discharge: HOME | End: 2024-06-04
Attending: EMERGENCY MEDICINE
Payer: MEDICARE

## 2024-06-04 ENCOUNTER — APPOINTMENT (OUTPATIENT)
Dept: CT IMAGING | Facility: HOSPITAL | Age: 84
End: 2024-06-04
Payer: MEDICARE

## 2024-06-04 VITALS
WEIGHT: 120 LBS | BODY MASS INDEX: 23.56 KG/M2 | SYSTOLIC BLOOD PRESSURE: 145 MMHG | DIASTOLIC BLOOD PRESSURE: 61 MMHG | OXYGEN SATURATION: 100 % | TEMPERATURE: 98 F | HEIGHT: 60 IN | HEART RATE: 71 BPM | RESPIRATION RATE: 18 BRPM

## 2024-06-04 DIAGNOSIS — M16.0 OSTEOARTHRITIS OF BOTH HIPS, UNSPECIFIED OSTEOARTHRITIS TYPE: ICD-10-CM

## 2024-06-04 DIAGNOSIS — R20.2 PARESTHESIA: ICD-10-CM

## 2024-06-04 DIAGNOSIS — K52.9 ENTERITIS: ICD-10-CM

## 2024-06-04 DIAGNOSIS — R09.89 LABILE BLOOD PRESSURE: ICD-10-CM

## 2024-06-04 DIAGNOSIS — R10.13 EPIGASTRIC ABDOMINAL PAIN: Primary | ICD-10-CM

## 2024-06-04 DIAGNOSIS — R51.9 LEFT FACIAL PAIN: ICD-10-CM

## 2024-06-04 LAB
ALBUMIN SERPL-MCNC: 4.8 G/DL (ref 3.5–5.2)
ALBUMIN/GLOB SERPL: 1.7 G/DL
ALP SERPL-CCNC: 71 U/L (ref 39–117)
ALT SERPL W P-5'-P-CCNC: 16 U/L (ref 1–33)
ANION GAP SERPL CALCULATED.3IONS-SCNC: 13 MMOL/L (ref 5–15)
AST SERPL-CCNC: 24 U/L (ref 1–32)
BACTERIA UR QL AUTO: ABNORMAL /HPF
BASOPHILS # BLD AUTO: 0.04 10*3/MM3 (ref 0–0.2)
BASOPHILS NFR BLD AUTO: 0.6 % (ref 0–1.5)
BILIRUB SERPL-MCNC: 0.3 MG/DL (ref 0–1.2)
BILIRUB UR QL STRIP: NEGATIVE
BUN SERPL-MCNC: 17 MG/DL (ref 8–23)
BUN/CREAT SERPL: 17.9 (ref 7–25)
CALCIUM SPEC-SCNC: 9.3 MG/DL (ref 8.6–10.5)
CHLORIDE SERPL-SCNC: 102 MMOL/L (ref 98–107)
CLARITY UR: CLEAR
CO2 SERPL-SCNC: 25 MMOL/L (ref 22–29)
COLOR UR: YELLOW
CREAT SERPL-MCNC: 0.95 MG/DL (ref 0.57–1)
D-LACTATE SERPL-SCNC: 0.9 MMOL/L (ref 0.5–2)
DEPRECATED RDW RBC AUTO: 40.3 FL (ref 37–54)
EGFRCR SERPLBLD CKD-EPI 2021: 59.9 ML/MIN/1.73
EOSINOPHIL # BLD AUTO: 0.2 10*3/MM3 (ref 0–0.4)
EOSINOPHIL NFR BLD AUTO: 3.1 % (ref 0.3–6.2)
ERYTHROCYTE [DISTWIDTH] IN BLOOD BY AUTOMATED COUNT: 12.1 % (ref 12.3–15.4)
GLOBULIN UR ELPH-MCNC: 2.9 GM/DL
GLUCOSE SERPL-MCNC: 111 MG/DL (ref 65–99)
GLUCOSE UR STRIP-MCNC: NEGATIVE MG/DL
HCT VFR BLD AUTO: 39.2 % (ref 34–46.6)
HGB BLD-MCNC: 13.2 G/DL (ref 12–15.9)
HGB UR QL STRIP.AUTO: NEGATIVE
HOLD SPECIMEN: NORMAL
HYALINE CASTS UR QL AUTO: ABNORMAL /LPF
IMM GRANULOCYTES # BLD AUTO: 0.01 10*3/MM3 (ref 0–0.05)
IMM GRANULOCYTES NFR BLD AUTO: 0.2 % (ref 0–0.5)
KETONES UR QL STRIP: NEGATIVE
LEUKOCYTE ESTERASE UR QL STRIP.AUTO: ABNORMAL
LIPASE SERPL-CCNC: 62 U/L (ref 13–60)
LYMPHOCYTES # BLD AUTO: 1.32 10*3/MM3 (ref 0.7–3.1)
LYMPHOCYTES NFR BLD AUTO: 20.3 % (ref 19.6–45.3)
MAGNESIUM SERPL-MCNC: 2.7 MG/DL (ref 1.6–2.4)
MCH RBC QN AUTO: 30.7 PG (ref 26.6–33)
MCHC RBC AUTO-ENTMCNC: 33.7 G/DL (ref 31.5–35.7)
MCV RBC AUTO: 91.2 FL (ref 79–97)
MONOCYTES # BLD AUTO: 0.46 10*3/MM3 (ref 0.1–0.9)
MONOCYTES NFR BLD AUTO: 7.1 % (ref 5–12)
NEUTROPHILS NFR BLD AUTO: 4.47 10*3/MM3 (ref 1.7–7)
NEUTROPHILS NFR BLD AUTO: 68.7 % (ref 42.7–76)
NITRITE UR QL STRIP: NEGATIVE
NRBC BLD AUTO-RTO: 0 /100 WBC (ref 0–0.2)
PH UR STRIP.AUTO: 7.5 [PH] (ref 5–8)
PLATELET # BLD AUTO: 221 10*3/MM3 (ref 140–450)
PMV BLD AUTO: 9.2 FL (ref 6–12)
POTASSIUM SERPL-SCNC: 4.7 MMOL/L (ref 3.5–5.2)
PROT SERPL-MCNC: 7.7 G/DL (ref 6–8.5)
PROT UR QL STRIP: NEGATIVE
RBC # BLD AUTO: 4.3 10*6/MM3 (ref 3.77–5.28)
RBC # UR STRIP: ABNORMAL /HPF
REF LAB TEST METHOD: ABNORMAL
SODIUM SERPL-SCNC: 140 MMOL/L (ref 136–145)
SP GR UR STRIP: 1.01 (ref 1–1.03)
SQUAMOUS #/AREA URNS HPF: ABNORMAL /HPF
TROPONIN T SERPL HS-MCNC: <6 NG/L
UROBILINOGEN UR QL STRIP: ABNORMAL
WBC # UR STRIP: ABNORMAL /HPF
WBC NRBC COR # BLD AUTO: 6.5 10*3/MM3 (ref 3.4–10.8)
WHOLE BLOOD HOLD COAG: NORMAL
WHOLE BLOOD HOLD SPECIMEN: NORMAL

## 2024-06-04 PROCEDURE — 70450 CT HEAD/BRAIN W/O DYE: CPT

## 2024-06-04 PROCEDURE — 99284 EMERGENCY DEPT VISIT MOD MDM: CPT

## 2024-06-04 PROCEDURE — 85025 COMPLETE CBC W/AUTO DIFF WBC: CPT | Performed by: EMERGENCY MEDICINE

## 2024-06-04 PROCEDURE — 96374 THER/PROPH/DIAG INJ IV PUSH: CPT

## 2024-06-04 PROCEDURE — 25810000003 SODIUM CHLORIDE 0.9 % SOLUTION: Performed by: EMERGENCY MEDICINE

## 2024-06-04 PROCEDURE — 93005 ELECTROCARDIOGRAM TRACING: CPT

## 2024-06-04 PROCEDURE — 93005 ELECTROCARDIOGRAM TRACING: CPT | Performed by: EMERGENCY MEDICINE

## 2024-06-04 PROCEDURE — 81001 URINALYSIS AUTO W/SCOPE: CPT | Performed by: EMERGENCY MEDICINE

## 2024-06-04 PROCEDURE — 80053 COMPREHEN METABOLIC PANEL: CPT | Performed by: EMERGENCY MEDICINE

## 2024-06-04 PROCEDURE — 83605 ASSAY OF LACTIC ACID: CPT | Performed by: EMERGENCY MEDICINE

## 2024-06-04 PROCEDURE — 83690 ASSAY OF LIPASE: CPT | Performed by: EMERGENCY MEDICINE

## 2024-06-04 PROCEDURE — 83735 ASSAY OF MAGNESIUM: CPT | Performed by: EMERGENCY MEDICINE

## 2024-06-04 PROCEDURE — 84484 ASSAY OF TROPONIN QUANT: CPT | Performed by: EMERGENCY MEDICINE

## 2024-06-04 PROCEDURE — 74176 CT ABD & PELVIS W/O CONTRAST: CPT

## 2024-06-04 PROCEDURE — 25010000002 ONDANSETRON PER 1 MG: Performed by: EMERGENCY MEDICINE

## 2024-06-04 RX ORDER — NALOXONE HYDROCHLORIDE 4 MG/.1ML
SPRAY NASAL
Qty: 2 EACH | Refills: 0 | Status: SHIPPED | OUTPATIENT
Start: 2024-06-04

## 2024-06-04 RX ORDER — HYDROCODONE BITARTRATE AND ACETAMINOPHEN 5; 325 MG/1; MG/1
1 TABLET ORAL ONCE
Status: COMPLETED | OUTPATIENT
Start: 2024-06-04 | End: 2024-06-04

## 2024-06-04 RX ORDER — ONDANSETRON 4 MG/1
4 TABLET, ORALLY DISINTEGRATING ORAL EVERY 6 HOURS PRN
Qty: 12 TABLET | Refills: 0 | Status: SHIPPED | OUTPATIENT
Start: 2024-06-04

## 2024-06-04 RX ORDER — HYDROCODONE BITARTRATE AND ACETAMINOPHEN 5; 325 MG/1; MG/1
1 TABLET ORAL EVERY 6 HOURS PRN
Qty: 12 TABLET | Refills: 0 | Status: SHIPPED | OUTPATIENT
Start: 2024-06-04

## 2024-06-04 RX ORDER — SODIUM CHLORIDE 0.9 % (FLUSH) 0.9 %
10 SYRINGE (ML) INJECTION AS NEEDED
Status: DISCONTINUED | OUTPATIENT
Start: 2024-06-04 | End: 2024-06-04 | Stop reason: HOSPADM

## 2024-06-04 RX ORDER — DICYCLOMINE HCL 20 MG
20 TABLET ORAL EVERY 6 HOURS PRN
Qty: 30 TABLET | Refills: 0 | Status: SHIPPED | OUTPATIENT
Start: 2024-06-04

## 2024-06-04 RX ORDER — ONDANSETRON 2 MG/ML
4 INJECTION INTRAMUSCULAR; INTRAVENOUS ONCE
Status: COMPLETED | OUTPATIENT
Start: 2024-06-04 | End: 2024-06-04

## 2024-06-04 RX ADMIN — SODIUM CHLORIDE 1000 ML: 9 INJECTION, SOLUTION INTRAVENOUS at 05:06

## 2024-06-04 RX ADMIN — HYDROCODONE BITARTRATE AND ACETAMINOPHEN 1 TABLET: 5; 325 TABLET ORAL at 04:57

## 2024-06-04 RX ADMIN — ONDANSETRON 4 MG: 2 INJECTION INTRAMUSCULAR; INTRAVENOUS at 05:05

## 2024-06-04 NOTE — DISCHARGE INSTRUCTIONS
Continue your usual medications as prescribed, but monitor your blood pressure and skip your lisinopril or blood pressure medicine if your blood pressure is not high and you take the hydrocodone pain medicine, as the combination can decrease your blood pressure too much.

## 2024-06-04 NOTE — ED PROVIDER NOTES
"Subjective   History of Present Illness  84 year old female presents to the emergency department with concerns about severe 7 out of 10 nonradiating epigastric abdominal pain with associated diarrhea and lightheadedness for one day. She was told she has \"a spot on her pancreas\" and is scheduled for an MRI next week. She was advised she has a nodule on her stomach through [facility]. She tried sucralfate approximately 4 hours prior to arrival with inadequate relief. She takes omeprazole daily chronically. She denies recent known eating of spoiled food, or recent known sick contacts with gastrointestinal symptoms.       Review of Systems   Constitutional:  Negative for diaphoresis and fever.   HENT:  Negative for facial swelling.         Left facial pain   Eyes:  Negative for photophobia and discharge.   Respiratory:  Negative for stridor.    Gastrointestinal:  Positive for abdominal pain and diarrhea. Negative for nausea and vomiting.   Genitourinary:  Negative for dysuria, frequency, hematuria and urgency.   Musculoskeletal:  Positive for arthralgias and myalgias.        Has some left upper extremity pain and tingling today, but denies loss of sensation or acute focal weakness. Has chronic right upper extremity pain she attributes to osteoarthritis.    Neurological:  Positive for headaches (gradual onset, not severe.). Negative for speech difficulty.       Past Medical History:   Diagnosis Date    Arthritis     GERD (gastroesophageal reflux disease)     History of transfusion     after delievery of child     Hyperlipidemia     Hypertension     Low back pain     Osteoporosis     PONV (postoperative nausea and vomiting)     Psoriasis     history of     Wears glasses        Allergies   Allergen Reactions    Tramadol Other (See Comments)     \"weakness\"     Tylenol With Codeine #3 [Acetaminophen-Codeine] Other (See Comments)     Fainted one time when took it, has not taken since     Codeine Unknown (See Comments)       Past " Surgical History:   Procedure Laterality Date    CARPAL TUNNEL RELEASE      CHOLECYSTECTOMY      COLONOSCOPY  2017    ENDOSCOPY      LUMBAR DISCECTOMY FUSION INSTRUMENTATION N/A 6/26/2019    Procedure: DECOMPRESSION AND FUSION WITH INSTRUMENTATION L4-5;  Surgeon: Gilbert Narayanan MD;  Location: [facility] OR;  Service: Orthopedic Spine       History reviewed. No pertinent family history.    Social History     Socioeconomic History    Marital status:    Tobacco Use    Smoking status: Never    Smokeless tobacco: Never   Substance and Sexual Activity    Alcohol use: No    Drug use: No    Sexual activity: Defer           Objective   Physical Exam  Vitals and nursing note reviewed.   Constitutional:       Appearance: She is not diaphoretic.      Comments: BMI 23. Appears uncomfortable.  Patient initially evaluated in the provider in triage area due to a full emergency department.   HENT:      Mouth/Throat:      Comments: Tacky mucosa  Eyes:      General: No scleral icterus.  Cardiovascular:      Rate and Rhythm: Tachycardia present.   Pulmonary:      Effort: Pulmonary effort is normal. No respiratory distress.      Breath sounds: No stridor.   Abdominal:      General: There is no distension.      Palpations: Abdomen is soft.      Tenderness: There is abdominal tenderness (mild) in the epigastric area. There is no guarding.   Musculoskeletal:      Comments: 2+ radial pulses, symmetric. No calf tenderness bilaterally. No significant peripheral edema.    Skin:     General: Skin is warm and dry.   Neurological:      Mental Status: She is alert.      Comments: No dysarthria. No facial droop. Motor 5/5 power x 4 extremities, symmetric. Sensation grossly intact to light touch. Rapid alternating movements within normal limits. No dysmetria or past-pointing on finger-to-nose testing.           Procedures           ED Course  ED Course as of 06/04/24 0936   Tue Jun 04, 2024   0600 Bacteria, UA: None Seen [LD]   0600 Nitrite,  UA: Negative [LD]   0600 WBC: 6.50 [LD]   0700 BP: 136/72 [LD]   0927 Results and plan discussed with the patient and her son at the bedside.  All questions addressed.  Repeat heart rate 70 bpm.  Repeat blood pressure 136/67.  Pain is 3 out of 10 in intensity. [LD]      ED Course User Index  [LD] Cassidy Galloway MD                                     [facility] reviewed by Cassidy Galloway MD       Medical Decision Making  Differential diagnosis includes gastritis, PUD, pancreatitis, choledocholithiasis, dehydration, electrolyte abnormality, labile blood pressure, symptomatic hypertension, hypertensive urgency, hypertensive emergency, and others.    Problems Addressed:  Enteritis: complicated acute illness or injury  Epigastric abdominal pain: complicated acute illness or injury  Labile blood pressure: complicated acute illness or injury  Left facial pain: complicated acute illness or injury  Osteoarthritis of both hips, unspecified osteoarthritis type: complicated acute illness or injury  Paresthesia: complicated acute illness or injury    Amount and/or Complexity of Data Reviewed  Independent Historian:      Details: Son at bedside  Labs: ordered. Decision-making details documented in ED Course.     Details: UA sterile pyuria without acute urinary symptoms.   Radiology: ordered. Decision-making details documented in ED Course.  ECG/medicine tests: ordered and independent interpretation performed. Decision-making details documented in ED Course.     Details: EKG at 0346 shows normal sinus rhythm at a rate of 96 bpm, normal intervals, no acute ischemic changes.    Risk  Prescription drug management.      Recent Results (from the past 24 hour(s))   ECG 12 Lead ED Triage Standing Order; Abdominal Pain (Upper)    Collection Time: 06/04/24  3:46 AM   Result Value Ref Range    QT Interval 366 ms    QTC Interval 462 ms   Comprehensive Metabolic Panel    Collection Time: 06/04/24  5:05 AM    Specimen: Blood   Result Value  Ref Range    Glucose 111 (H) 65 - 99 mg/dL    BUN 17 8 - 23 mg/dL    Creatinine 0.95 0.57 - 1.00 mg/dL    Sodium 140 136 - 145 mmol/L    Potassium 4.7 3.5 - 5.2 mmol/L    Chloride 102 98 - 107 mmol/L    CO2 25.0 22.0 - 29.0 mmol/L    Calcium 9.3 8.6 - 10.5 mg/dL    Total Protein 7.7 6.0 - 8.5 g/dL    Albumin 4.8 3.5 - 5.2 g/dL    ALT (SGPT) 16 1 - 33 U/L    AST (SGOT) 24 1 - 32 U/L    Alkaline Phosphatase 71 39 - 117 U/L    Total Bilirubin 0.3 0.0 - 1.2 mg/dL    Globulin 2.9 gm/dL    A/G Ratio 1.7 g/dL    BUN/Creatinine Ratio 17.9 7.0 - 25.0    Anion Gap 13.0 5.0 - 15.0 mmol/L    eGFR 59.9 (L) >60.0 mL/min/1.73   Lipase    Collection Time: 06/04/24  5:05 AM    Specimen: Blood   Result Value Ref Range    Lipase 62 (H) 13 - 60 U/L   Green Top (Gel)    Collection Time: 06/04/24  5:05 AM   Result Value Ref Range    Extra Tube Hold for add-ons.    Lavender Top    Collection Time: 06/04/24  5:05 AM   Result Value Ref Range    Extra Tube hold for add-on    Gold Top - SST    Collection Time: 06/04/24  5:05 AM   Result Value Ref Range    Extra Tube Hold for add-ons.    Gray Top    Collection Time: 06/04/24  5:05 AM   Result Value Ref Range    Extra Tube Hold for add-ons.    Light Blue Top    Collection Time: 06/04/24  5:05 AM   Result Value Ref Range    Extra Tube Hold for add-ons.    CBC Auto Differential    Collection Time: 06/04/24  5:05 AM    Specimen: Blood   Result Value Ref Range    WBC 6.50 3.40 - 10.80 10*3/mm3    RBC 4.30 3.77 - 5.28 10*6/mm3    Hemoglobin 13.2 12.0 - 15.9 g/dL    Hematocrit 39.2 34.0 - 46.6 %    MCV 91.2 79.0 - 97.0 fL    MCH 30.7 26.6 - 33.0 pg    MCHC 33.7 31.5 - 35.7 g/dL    RDW 12.1 (L) 12.3 - 15.4 %    RDW-SD 40.3 37.0 - 54.0 fl    MPV 9.2 6.0 - 12.0 fL    Platelets 221 140 - 450 10*3/mm3    Neutrophil % 68.7 42.7 - 76.0 %    Lymphocyte % 20.3 19.6 - 45.3 %    Monocyte % 7.1 5.0 - 12.0 %    Eosinophil % 3.1 0.3 - 6.2 %    Basophil % 0.6 0.0 - 1.5 %    Immature Grans % 0.2 0.0 - 0.5 %     Neutrophils, Absolute 4.47 1.70 - 7.00 10*3/mm3    Lymphocytes, Absolute 1.32 0.70 - 3.10 10*3/mm3    Monocytes, Absolute 0.46 0.10 - 0.90 10*3/mm3    Eosinophils, Absolute 0.20 0.00 - 0.40 10*3/mm3    Basophils, Absolute 0.04 0.00 - 0.20 10*3/mm3    Immature Grans, Absolute 0.01 0.00 - 0.05 10*3/mm3    nRBC 0.0 0.0 - 0.2 /100 WBC   Lactic Acid, Plasma    Collection Time: 06/04/24  5:05 AM    Specimen: Blood   Result Value Ref Range    Lactate 0.9 0.5 - 2.0 mmol/L   Magnesium    Collection Time: 06/04/24  5:05 AM    Specimen: Blood   Result Value Ref Range    Magnesium 2.7 (H) 1.6 - 2.4 mg/dL   Urinalysis With Microscopic If Indicated (No Culture) - Urine, Clean Catch    Collection Time: 06/04/24  5:24 AM    Specimen: Urine, Clean Catch   Result Value Ref Range    Color, UA Yellow Yellow, Straw    Appearance, UA Clear Clear    pH, UA 7.5 5.0 - 8.0    Specific Gravity, UA 1.007 1.001 - 1.030    Glucose, UA Negative Negative    Ketones, UA Negative Negative    Bilirubin, UA Negative Negative    Blood, UA Negative Negative    Protein, UA Negative Negative    Leuk Esterase, UA Moderate (2+) (A) Negative    Nitrite, UA Negative Negative    Urobilinogen, UA 0.2 E.U./dL 0.2 - 1.0 E.U./dL   Urinalysis, Microscopic Only - Urine, Clean Catch    Collection Time: 06/04/24  5:24 AM    Specimen: Urine, Clean Catch   Result Value Ref Range    RBC, UA 0-2 None Seen, 0-2 /HPF    WBC, UA 6-10 (A) None Seen, 0-2 /HPF    Bacteria, UA None Seen None Seen, Trace /HPF    Squamous Epithelial Cells, UA 0-2 None Seen, 0-2 /HPF    Hyaline Casts, UA 0-6 0 - 6 /LPF    Methodology Automated Microscopy    Single High Sensitivity Troponin T    Collection Time: 06/04/24  6:34 AM    Specimen: Blood   Result Value Ref Range    HS Troponin T <6 <14 ng/L     Note: In addition to lab results from this visit, the labs listed above may include labs taken at another facility or during a different encounter within the last 24 hours. Please correlate lab  times with ED admission and discharge times for further clarification of the services performed during this visit.    CT Head Without Contrast   Final Result   Impression:   No acute intracranial process.            Electronically Signed: Mariana Helton MD     6/4/2024 5:43 AM EDT     Workstation ID: [id number]      CT Abdomen Pelvis Without Contrast   Final Result   Impression:   Scattered dilated small bowel loops within the mid and inferior abdomen. Findings may be related to a nonspecific enteritis though developing obstruction cannot be excluded though less likely as these are in 2 different locations. A small amount of free    fluid is present within the pelvis likely reactive. Limited evaluation due to lack of intravenous and oral contrast.                  Electronically Signed: Mariana Helton MD     6/4/2024 5:46 AM EDT     Workstation ID: [id number]        Vitals:    06/04/24 0615 06/04/24 0630 06/04/24 0645 06/04/24 0700   BP: 137/65 136/72 149/73 136/67   BP Location:       Patient Position:       Pulse: 76 75 70 70   Resp:       Temp:       TempSrc:       SpO2: 99% 100% 99% 98%   Weight:       Height:         Medications   sodium chloride 0.9 % flush 10 mL (has no administration in time range)   ondansetron (ZOFRAN) injection 4 mg (4 mg Intravenous Given 6/4/24 0505)   sodium chloride 0.9 % bolus 1,000 mL (0 mL Intravenous Stopped 6/4/24 0610)   HYDROcodone-acetaminophen (NORCO) 5-325 MG per tablet 1 tablet (1 tablet Oral Given 6/4/24 0457)     ECG/EMG Results (last 24 hours)       Procedure Component Value Units Date/Time    ECG 12 Lead ED Triage Standing Order; Abdominal Pain (Upper) [[id number]] Collected: 06/04/24 0346     Updated: 06/04/24 0346     QT Interval 366 ms      QTC Interval 462 ms     Narrative:      Test Reason : ED Triage Standing Order~  Blood Pressure :   */*   mmHG  Vent. Rate :  96 BPM     Atrial Rate :  96 BPM     P-R Int : 130 ms          QRS Dur :  82 ms      QT Int : 366 ms       P-R-T  Axes :  54 -29   6 degrees     QTc Int : 462 ms    Normal sinus rhythm  Normal ECG  When compared with ECG of 25-JUN-2019 13:27,  No significant change was found    Referred By:            Confirmed By:           ECG 12 Lead ED Triage Standing Order; Abdominal Pain (Upper)   Preliminary Result   Test Reason : ED Triage Standing Order~   Blood Pressure :   */*   mmHG   Vent. Rate :  96 BPM     Atrial Rate :  96 BPM      P-R Int : 130 ms          QRS Dur :  82 ms       QT Int : 366 ms       P-R-T Axes :  54 -29   6 degrees      QTc Int : 462 ms      Normal sinus rhythm   Normal ECG   When compared with ECG of 25-JUN-2019 13:27,   No significant change was found      Referred By:            Confirmed By:         Discharge instructions include:  Continue your usual medications as prescribed, but monitor your blood pressure and skip your lisinopril or blood pressure medicine if your blood pressure is not high and you take the hydrocodone pain medicine, as the combination can decrease your blood pressure too much.       Final diagnoses:   Epigastric abdominal pain   Enteritis   Labile blood pressure   Left facial pain   Paresthesia   Osteoarthritis of both hips, unspecified osteoarthritis type       ED Disposition  ED Disposition       ED Disposition   Discharge    Condition   Stable    Comment   --               Mauro Khalil MD  [address] [place] [address]   [facility] [address]  [facility] [id number] [phone]    Schedule an appointment as soon as possible for a visit in 1 week  primary care provider, keep blood pressure log twice daily for your primary care provider to review.    Rubina Ricci MD  [address] [facility]  [address]  [facility] [address] [phone]    Schedule an appointment as soon as possible for a visit in 1 week  This is a rheumatologist or you can follow up with your rheumatologist of choice for further evaluation and treatment of your joint pain.         Medication List        New Prescriptions       dicyclomine 20 MG tablet  Commonly known as: BENTYL  Take 1 tablet by mouth Every 6 (Six) Hours As Needed (abdominal pain).     naloxone 4 MG/0.1ML nasal spray  Commonly known as: NARCAN  Call 911. Don't prime. [place] in 1 nostril for overdose. Repeat in 2-3 minutes in other nostril if no or minimal breathing/responsiveness.     ondansetron ODT 4 MG disintegrating tablet  Commonly known as: ZOFRAN-ODT  Place 1 tablet on the tongue Every 6 (Six) Hours As Needed for Nausea or Vomiting. As needed for nausea            Changed      * HYDROcodone-acetaminophen 5-325 MG per tablet  Commonly known as: NORCO  Take 1 tablet by mouth Every 6 (Six) Hours As Needed for Moderate Pain .  What changed: Another medication with the same name was added. Make sure you understand how and when to take each.     * HYDROcodone-acetaminophen 5-325 MG per tablet  Commonly known as: NORCO  Take 1 tablet by mouth Every 6 (Six) Hours As Needed for Severe Pain.  What changed: You were already taking a medication with the same name, and this prescription was added. Make sure you understand how and when to take each.           * This list has 2 medication(s) that are the same as other medications prescribed for you. Read the directions carefully, and ask your doctor or other care provider to review them with you.                   Where to Get Your Medications        These medications were sent to [id number] - [place], [place] - [address] - [phone]  - [phone] [address], [place] [address]      Phone: [phone]   dicyclomine 20 MG tablet  HYDROcodone-acetaminophen 5-325 MG per tablet  naloxone 4 MG/0.1ML nasal spray  ondansetron ODT 4 MG disintegrating tablet            Cassidy Galloway MD  06/08/24 5462

## 2024-06-05 LAB
QT INTERVAL: 366 MS
QTC INTERVAL: 462 MS

## 2024-12-02 ENCOUNTER — HOSPITAL ENCOUNTER (EMERGENCY)
Facility: HOSPITAL | Age: 84
Discharge: HOME | End: 2024-12-02
Attending: EMERGENCY MEDICINE | Admitting: EMERGENCY MEDICINE
Payer: MEDICARE

## 2024-12-02 ENCOUNTER — APPOINTMENT (OUTPATIENT)
Dept: CT IMAGING | Facility: HOSPITAL | Age: 84
End: 2024-12-02
Payer: MEDICARE

## 2024-12-02 VITALS
HEIGHT: 60 IN | DIASTOLIC BLOOD PRESSURE: 77 MMHG | OXYGEN SATURATION: 100 % | WEIGHT: 115 LBS | RESPIRATION RATE: 18 BRPM | BODY MASS INDEX: 22.58 KG/M2 | HEART RATE: 101 BPM | SYSTOLIC BLOOD PRESSURE: 137 MMHG | TEMPERATURE: 98.6 F

## 2024-12-02 DIAGNOSIS — S09.90XA CLOSED HEAD INJURY, INITIAL ENCOUNTER: Primary | ICD-10-CM

## 2024-12-02 PROCEDURE — 99284 EMERGENCY DEPT VISIT MOD MDM: CPT

## 2024-12-02 PROCEDURE — 70450 CT HEAD/BRAIN W/O DYE: CPT

## 2024-12-02 NOTE — ED PROVIDER NOTES
[place]    EMERGENCY DEPARTMENT ENCOUNTER      Pt Name: Martine Win  MRN: [id number]  YOB: 1940  Date of evaluation: 12/2/2024  Provider: Will Calhoun DO    CHIEF COMPLAINT       Chief Complaint   Patient presents with    Head Injury       HPI  Stated Reason for Visit: PT PRESENTS TO ED FOR FALL ON THURSDAY WITH HEAD INJURY. PT SLIPPED AND FELL IN HER BATHROOM, HIT HER HEAD ON THE TILE. STATES SHE WAS FINE ON FRIDAY, STARTED WITH A HEADACHE AND NAUSEA ON SATURDAY. STATES SHE WAS FEELING SLEEPY YESTERDAY. CONCERNED FOR A CONCUSSION. NO BLOOD THINNERS. History Obtained From: patient       HISTORY OF PRESENT ILLNESS  (Location/Symptom, Timing/Onset, Context/Setting, Quality, Duration, Modifying Factors, Severity.)   Martine Win is a 84 y.o. female who presents to the emergency department for evaluation status post a fall with a head injury which occurred 4 days ago on Thanksgiving.  She notes she slipped and fall hitting the posterior scalp without any loss of consciousness.  She notes finding Saturday with mild lightheadedness, nausea and intermittent headache.  She is not taking up with any medications.  Was trying to get a CT scan of the head completed but in [place] she notes her PCP told her to be a couple weeks instructed to, hospital for further assessment.  She denies any other injury, back or discomfort or pain after the fall, denies any neck pain, no open wound or bleeding.  She denies any other acute systemic complaints.      Nursing notes were reviewed.      PAST MEDICAL HISTORY     Past Medical History:   Diagnosis Date    Arthritis     GERD (gastroesophageal reflux disease)     History of transfusion     after delievery of child     Hyperlipidemia     Hypertension     Low back pain     Osteoporosis     PONV (postoperative nausea and vomiting)     Psoriasis     history of     Wears glasses          SURGICAL HISTORY       Past Surgical History:   Procedure Laterality Date     CARPAL TUNNEL RELEASE      CHOLECYSTECTOMY      COLONOSCOPY  2017    ENDOSCOPY      LUMBAR DISCECTOMY FUSION INSTRUMENTATION N/A 6/26/2019    Procedure: DECOMPRESSION AND FUSION WITH INSTRUMENTATION L4-5;  Surgeon: Gilbert Narayanan MD;  Location: [facility] OR;  Service: Orthopedic Spine         CURRENT MEDICATIONS     No current facility-administered medications for this encounter.    Current Outpatient Medications:     acetaminophen (TYLENOL) 500 MG tablet, Take 1,000 mg by mouth 3 (Three) Times a Day As Needed for Mild Pain  or Moderate Pain ., Disp: , Rfl:     Alendronate Sodium (FOSAMAX PO), Take  by mouth 1 (One) Time Per Week., Disp: , Rfl:     calcium carbonate (OS-DARRYN) 600 MG tablet, Take 1,200 mg by mouth Daily., Disp: , Rfl:     dicyclomine (BENTYL) 20 MG tablet, Take 1 tablet by mouth Every 6 (Six) Hours As Needed (abdominal pain)., Disp: 30 tablet, Rfl: 0    docusate sodium (COLACE) 100 MG capsule, Take 1 capsule by mouth 2 (Two) Times a Day., Disp: 60 capsule, Rfl: 0    gabapentin (NEURONTIN) 300 MG capsule, Take 300 mg by mouth 3 (Three) Times a Day., Disp: , Rfl:     hydrochlorothiazide (HYDRODIURIL) 12.5 MG tablet, Take 12.5 mg by mouth Daily., Disp: , Rfl:     HYDROcodone-acetaminophen (NORCO) 5-325 MG per tablet, Take 1 tablet by mouth Every 6 (Six) Hours As Needed for Moderate Pain ., Disp: 40 tablet, Rfl: 0    HYDROcodone-acetaminophen (NORCO) 5-325 MG per tablet, Take 1 tablet by mouth Every 6 (Six) Hours As Needed for Severe Pain., Disp: 12 tablet, Rfl: 0    Loratadine (CLARITIN) 10 MG capsule, Take  by mouth Daily., Disp: , Rfl:     naloxone (NARCAN) 4 MG/0.1ML nasal spray, Call 911. Don't prime. [place] in 1 nostril for overdose. Repeat in 2-3 minutes in other nostril if no or minimal breathing/responsiveness., Disp: 2 each, Rfl: 0    omeprazole (priLOSEC) 20 MG capsule, Take 20 mg by mouth Daily As Needed., Disp: , Rfl:     ondansetron ODT (ZOFRAN-ODT) 4 MG disintegrating tablet, Place 1 tablet  "on the tongue Every 6 (Six) Hours As Needed for Nausea or Vomiting. As needed for nausea, Disp: 12 tablet, Rfl: 0    SIMVASTATIN PO, Take  by mouth Daily., Disp: , Rfl:     ALLERGIES     Tramadol, Tylenol with codeine #3 [acetaminophen-codeine], and Codeine    FAMILY HISTORY     No family history on file.       SOCIAL HISTORY       Social History     Socioeconomic History    Marital status:    Tobacco Use    Smoking status: Never    Smokeless tobacco: Never   Substance and Sexual Activity    Alcohol use: No    Drug use: No    Sexual activity: Defer         PHYSICAL EXAM    (up to 7 for level 4, 8 or more for level 5)     Vitals:    12/02/24 1400   BP: (!) 185/123   Patient Position: Sitting   Pulse: 118   Resp: 14   Temp: 98.6 °F (37 °C)   TempSrc: Oral   SpO2: 100%   Weight: 52.2 kg (115 lb)   Height: 152.4 cm (60\")       Physical Exam  General : Patient is awake, alert, oriented, in no acute distress, nontoxic appearing  HEENT: Head is normocephalic, there is no there is no palpable skull fracture or injury, no significant contusion or hematoma, significant swelling, pupils are equally round, EOMI, conjunctivae clear  Neck: Neck is supple, full range of motion, trachea midline  Cardiac: Heart regular rate, rhythm, no murmurs, rubs, or gallops  Lungs: Lungs are clear to auscultation, there is no wheezing, rhonchi, or rales. There is no use of accessory muscles  Abdomen: Abdomen is soft, nontender, nondistended. There are no firm or pulsatile masses, no rebound rigidity or guarding  Musculoskeletal:  No focal muscle deficits are appreciated  Neuro: Motor intact, sensory intact, level of consciousness is normal, GCS 15  Dermatology: Skin is warm and dry  Psych: Mentation is grossly normal, cognition is grossly normal. Affect is appropriate      DIAGNOSTIC RESULTS     EKG:  All EKGs are interpreted by the Emergency Department Physician who either signs or Co-signs this chart in the absence of a " cardiologist.    No orders to display       RADIOLOGY:     [x] Radiologist's Report Reviewed:  CT Head Without Contrast   Final Result   Impression:   1.No evidence of acute intracranial hemorrhage or large territory infarct.   2.Chronic changes as above.            Electronically Signed: Jaya Argueta MD     12/2/2024 3:03 PM EST     Workstation ID: [id number]          I ordered and independently reviewed the above noted radiographic studies.      I viewed images of CT head which showed no acute intracranial abnormality per my independent interpretation.    See radiologist's dictation for official interpretation.      ED BEDSIDE ULTRASOUND:   Performed by ED Physician - none    LABS:    I have reviewed and interpreted all of the currently available lab results from this visit (if applicable):  Results for orders placed or performed during the hospital encounter of 06/04/24   ECG 12 Lead ED Triage Standing Order; Abdominal Pain (Upper)    Collection Time: 06/04/24  3:46 AM   Result Value Ref Range    QT Interval 366 ms    QTC Interval 462 ms   Comprehensive Metabolic Panel    Collection Time: 06/04/24  5:05 AM    Specimen: Blood   Result Value Ref Range    Glucose 111 (H) 65 - 99 mg/dL    BUN 17 8 - 23 mg/dL    Creatinine 0.95 0.57 - 1.00 mg/dL    Sodium 140 136 - 145 mmol/L    Potassium 4.7 3.5 - 5.2 mmol/L    Chloride 102 98 - 107 mmol/L    CO2 25.0 22.0 - 29.0 mmol/L    Calcium 9.3 8.6 - 10.5 mg/dL    Total Protein 7.7 6.0 - 8.5 g/dL    Albumin 4.8 3.5 - 5.2 g/dL    ALT (SGPT) 16 1 - 33 U/L    AST (SGOT) 24 1 - 32 U/L    Alkaline Phosphatase 71 39 - 117 U/L    Total Bilirubin 0.3 0.0 - 1.2 mg/dL    Globulin 2.9 gm/dL    A/G Ratio 1.7 g/dL    BUN/Creatinine Ratio 17.9 7.0 - 25.0    Anion Gap 13.0 5.0 - 15.0 mmol/L    eGFR 59.9 (L) >60.0 mL/min/1.73   Lipase    Collection Time: 06/04/24  5:05 AM    Specimen: Blood   Result Value Ref Range    Lipase 62 (H) 13 - 60 U/L   CBC Auto Differential    Collection Time:  06/04/24  5:05 AM    Specimen: Blood   Result Value Ref Range    WBC 6.50 3.40 - 10.80 10*3/mm3    RBC 4.30 3.77 - 5.28 10*6/mm3    Hemoglobin 13.2 12.0 - 15.9 g/dL    Hematocrit 39.2 34.0 - 46.6 %    MCV 91.2 79.0 - 97.0 fL    MCH 30.7 26.6 - 33.0 pg    MCHC 33.7 31.5 - 35.7 g/dL    RDW 12.1 (L) 12.3 - 15.4 %    RDW-SD 40.3 37.0 - 54.0 fl    MPV 9.2 6.0 - 12.0 fL    Platelets 221 140 - 450 10*3/mm3    Neutrophil % 68.7 42.7 - 76.0 %    Lymphocyte % 20.3 19.6 - 45.3 %    Monocyte % 7.1 5.0 - 12.0 %    Eosinophil % 3.1 0.3 - 6.2 %    Basophil % 0.6 0.0 - 1.5 %    Immature Grans % 0.2 0.0 - 0.5 %    Neutrophils, Absolute 4.47 1.70 - 7.00 10*3/mm3    Lymphocytes, Absolute 1.32 0.70 - 3.10 10*3/mm3    Monocytes, Absolute 0.46 0.10 - 0.90 10*3/mm3    Eosinophils, Absolute 0.20 0.00 - 0.40 10*3/mm3    Basophils, Absolute 0.04 0.00 - 0.20 10*3/mm3    Immature Grans, Absolute 0.01 0.00 - 0.05 10*3/mm3    nRBC 0.0 0.0 - 0.2 /100 WBC   Lactic Acid, Plasma    Collection Time: 06/04/24  5:05 AM    Specimen: Blood   Result Value Ref Range    Lactate 0.9 0.5 - 2.0 mmol/L   Magnesium    Collection Time: 06/04/24  5:05 AM    Specimen: Blood   Result Value Ref Range    Magnesium 2.7 (H) 1.6 - 2.4 mg/dL   Green Top (Gel)    Collection Time: 06/04/24  5:05 AM   Result Value Ref Range    Extra Tube Hold for add-ons.    Lavender Top    Collection Time: 06/04/24  5:05 AM   Result Value Ref Range    Extra Tube hold for add-on    Gold Top - SST    Collection Time: 06/04/24  5:05 AM   Result Value Ref Range    Extra Tube Hold for add-ons.    Gray Top    Collection Time: 06/04/24  5:05 AM   Result Value Ref Range    Extra Tube Hold for add-ons.    Light Blue Top    Collection Time: 06/04/24  5:05 AM   Result Value Ref Range    Extra Tube Hold for add-ons.    Urinalysis With Microscopic If Indicated (No Culture) - Urine, Clean Catch    Collection Time: 06/04/24  5:24 AM    Specimen: Urine, Clean Catch   Result Value Ref Range    Color, UA  Yellow Yellow, Straw    Appearance, UA Clear Clear    pH, UA 7.5 5.0 - 8.0    Specific Gravity, UA 1.007 1.001 - 1.030    Glucose, UA Negative Negative    Ketones, UA Negative Negative    Bilirubin, UA Negative Negative    Blood, UA Negative Negative    Protein, UA Negative Negative    Leuk Esterase, UA Moderate (2+) (A) Negative    Nitrite, UA Negative Negative    Urobilinogen, UA 0.2 E.U./dL 0.2 - 1.0 E.U./dL   Urinalysis, Microscopic Only - Urine, Clean Catch    Collection Time: 06/04/24  5:24 AM    Specimen: Urine, Clean Catch   Result Value Ref Range    RBC, UA 0-2 None Seen, 0-2 /HPF    WBC, UA 6-10 (A) None Seen, 0-2 /HPF    Bacteria, UA None Seen None Seen, Trace /HPF    Squamous Epithelial Cells, UA 0-2 None Seen, 0-2 /HPF    Hyaline Casts, UA 0-6 0 - 6 /LPF    Methodology Automated Microscopy    Single High Sensitivity Troponin T    Collection Time: 06/04/24  6:34 AM    Specimen: Blood   Result Value Ref Range    HS Troponin T <6 <14 ng/L        If labs were ordered, I independently reviewed the results and considered them in treating the patient.      EMERGENCY DEPARTMENT COURSE and DIFFERENTIAL DIAGNOSIS/MDM:   Vitals:  AS OF 15:15 EST    BP - (!) 185/123  HR - 118  TEMP - 98.6 °F (37 °C) (Oral)  O2 SATS - 100%      Orders placed during this visit:  Orders Placed This Encounter   Procedures    CT Head Without Contrast       All labs have been independently reviewed by me.  All radiology studies have been reviewed by me and the radiologist dictating the report.  All EKG's have been independently viewed and interpreted by me.      Discussion below represents my analysis of pertinent findings related to patient's condition, differential diagnosis, treatment plan and final disposition.    Differential diagnosis:  The differential diagnosis associated with the patient's presentation includes: Closed head injury, concussion, intracranial hematoma, skull contusion, soft tissue injury, headache     Additional  sources  Discussed/ obtained information from independent historians:   [] Spouse  [] Parent  [x] Family member  [] Friend  [] EMS   [] Other:    External (non-ED) record review:   [] Inpatient record:   [] Office record:   [] Outpatient record:   [] Prior Outpatient labs:   [] Prior Outpatient radiology:   [] Primary Care record:   [] Outside ED record:   [] Other:     Patient's care impacted by:   [] Diabetes  [] Hypertension  [] CHF  [] Hyperlipidemia  [] Coronary Artery Disease   [] COPD   [] Cancer   [] Tobacco Abuse   [] Substance Abuse    [] Other:     Care significantly affected by Social Determinants of Health (housing and economic circumstances, unemployment)    [] Yes     [x] No   If yes, Patient's care significantly limited by Social Determinants of Health including:   [] Inadequate housing   [] Low income   [] Alcoholism and drug addiction in family   [] Problems related to primary support group   [] Unemployment   [] Problems related to employment   [] Other Social Determinants of Health:       MEDICATIONS ADMINISTERED IN ED:  Medications - No data to display       This is a very pleasant 84-year-old female status post head injury 4 days ago with intermittent headache, dizziness.  She is nontoxic-appearing, no significant issues seen on physical examination today.  She states really she is here for CT scan of the head for reassurance to make sure there is nothing abnormal intracranial.  We did proceed forward with a CT head.  We discussed closed head injury precautions, headache, lightheadedness can be persisting over the next week or so which should resolve with time.  Tylenol, closed head injury precautions and close follow-up with her PCP would be warranted.    I had a discussion with the patient/family regarding diagnosis, diagnostic results, treatment plan, and medications.  The patient/family indicated understanding of these instructions.  I spent adequate time at the bedside preceding discharge  necessary to personally discuss the aftercare instructions, giving patient education, providing explanations of the results of our evaluations/findings, and my decision making to assure that the patient/family understand the plan of care.  I encouraged the patient to return to the emergency department immediately for ANY concerns, worsening, new complaints, or if symptoms persist and unable to seek follow-up in a timely fashion.  The patient/family expressed understanding and agreement with this plan.  The patient will follow-up with their PCP in 1-2 days for reevaluation.     PROCEDURES:  Procedures    CRITICAL CARE TIME    Total Critical Care time was 0 minutes, excluding separately reportable procedures.   There was a high probability of clinically significant/life threatening deterioration in the patient's condition which required my urgent intervention.      FINAL IMPRESSION      1. Closed head injury, initial encounter          DISPOSITION/PLAN     ED Disposition       ED Disposition   Discharge    Condition   Stable    Comment   --               PATIENT REFERRED TO:  Mauro Khalil MD  [address] [place] [address]   [address] [address]  [phone]    In 2 days      [facility] EMERGENCY DEPARTMENT  [address] [facility] [address] [phone]    If symptoms worsen      DISCHARGE MEDICATIONS:     Medication List        CONTINUE taking these medications      acetaminophen 500 MG tablet  Commonly known as: TYLENOL     calcium carbonate 600 MG tablet  Commonly known as: OS-DARRYN     Claritin 10 MG capsule  Generic drug: Loratadine     dicyclomine 20 MG tablet  Commonly known as: BENTYL  Take 1 tablet by mouth Every 6 (Six) Hours As Needed (abdominal pain).     FOSAMAX PO     gabapentin 300 MG capsule  Commonly known as: NEURONTIN     hydroCHLOROthiazide 12.5 MG tablet     * HYDROcodone-acetaminophen 5-325 MG per tablet  Commonly known as: NORCO  Take 1 tablet by mouth Every  6 (Six) Hours As Needed for Moderate Pain .     * HYDROcodone-acetaminophen 5-325 MG per tablet  Commonly known as: NORCO  Take 1 tablet by mouth Every 6 (Six) Hours As Needed for Severe Pain.     naloxone 4 MG/0.1ML nasal spray  Commonly known as: NARCAN  Call 911. Don't prime. [place] in 1 nostril for overdose. Repeat in 2-3 minutes in other nostril if no or minimal breathing/responsiveness.     omeprazole 20 MG capsule  Commonly known as: priLOSEC     ondansetron ODT 4 MG disintegrating tablet  Commonly known as: ZOFRAN-ODT  Place 1 tablet on the tongue Every 6 (Six) Hours As Needed for Nausea or Vomiting. As needed for nausea     SIMVASTATIN PO     Stool Softener 100 MG capsule  Generic drug: docusate sodium  Take 1 capsule by mouth 2 (Two) Times a Day.           * This list has 2 medication(s) that are the same as other medications prescribed for you. Read the directions carefully, and ask your doctor or other care provider to review them with you.                      Comment: Please note this report has been produced using speech recognition software.      Will Calhoun DO  Attending Emergency Physician         Will Calhoun,   12/02/24 5005

## (undated) DEVICE — GOWN,REINF,POLY,ECL,PP SLV,3XL,XLONG: Brand: MEDLINE

## (undated) DEVICE — GOWN,REINF,POLY,ECL,PP SLV,XL: Brand: MEDLINE

## (undated) DEVICE — GOWN,PREVENTION PLUS,XXLARGE,STERILE: Brand: MEDLINE

## (undated) DEVICE — ANTIBACTERIAL UNDYED BRAIDED (POLYGLACTIN 910), SYNTHETIC ABSORBABLE SUTURE: Brand: COATED VICRYL

## (undated) DEVICE — DISPOSABLE BIPOLAR FORCEPS 7 3/4" (19.7CM) SCOVILLE BAYONET, INSULATED, 1.5MM TIP AND 12 FT. (3.6M) CABLE: Brand: KIRWAN

## (undated) DEVICE — DRSNG TELFA PAD NONADH STR 1S 3X8IN

## (undated) DEVICE — PK SPINE ORTHO 10

## (undated) DEVICE — DRP C/ARMOR

## (undated) DEVICE — SUCTION CANISTER, 2500CC, RIGID: Brand: DEROYAL

## (undated) DEVICE — ADAPT LUER STUB INTRAMEDIC PE/200 17G

## (undated) DEVICE — GLV SURG SENSICARE W/ALOE PF LF 9 STRL

## (undated) DEVICE — 3M™ MEDIPORE™ H SOFT CLOTH SURGICAL TAPE 2862, 2 INCH X 10 YARD (5CM X 9,1M), 12 ROLLS/CASE: Brand: 3M™ MEDIPORE™

## (undated) DEVICE — SPNG GZ WOVN 4X4IN 12PLY 10/BX STRL

## (undated) DEVICE — OIL CARTRIDGE: Brand: CORE, MAESTRO

## (undated) DEVICE — CVR HNDL LT SURG ACCSSRY BLU STRL

## (undated) DEVICE — GAUZE,SPONGE,4"X4",16PLY,XRAY,STRL,LF: Brand: MEDLINE

## (undated) DEVICE — NDL HYPO ECLPS SFTY 22G 1 1/2IN

## (undated) DEVICE — 450 ML BOTTLE OF 0.05% CHLORHEXIDINE GLUCONATE IN 99.95% STERILE WATER FOR IRRIGATION, USP AND APPLICATOR.: Brand: IRRISEPT ANTIMICROBIAL WOUND LAVAGE

## (undated) DEVICE — INTENDED USE FOR SURGICAL MARKING ON INTACT SKIN, ALSO PROVIDES A PERMANENT METHOD OF IDENTIFYING OBJECTS IN THE OPERATING ROOM: Brand: WRITESITE® REGULAR TIP SKIN MARKER

## (undated) DEVICE — MEDI-VAC YANKAUER SUCTION HANDLE: Brand: CARDINAL HEALTH

## (undated) DEVICE — 3M™ STERI-STRIP™ REINFORCED ADHESIVE SKIN CLOSURES, R1547, 1/2 IN X 4 IN (12 MM X 100 MM), 6 STRIPS/ENVELOPE: Brand: 3M™ STERI-STRIP™

## (undated) DEVICE — SYR LL 10ML LF

## (undated) DEVICE — CONFIDENCE SPINAL CEMENT SYSTEM INTRODUCER NEEDLE, DIAMOND TIP 13G X 4 INCHES: Brand: CONFIDENCE SPINAL CEMENT SYSTEM

## (undated) DEVICE — PAD ARMBRD SURG CONVOL 7.5X20X2IN

## (undated) DEVICE — SNAP KOVER: Brand: UNBRANDED

## (undated) DEVICE — 2963 MEDIPORE SOFT CLOTH TAPE 3 IN X 10 YD 12 RLS/CS: Brand: 3M™ MEDIPORE™

## (undated) DEVICE — DIFFUSER: Brand: CORE, MAESTRO

## (undated) DEVICE — JACKSON TABLE POSITIONER KIT: Brand: MEDLINE INDUSTRIES, INC.

## (undated) DEVICE — MARKER,SKIN,W/RULER,DUAL,STOP: Brand: MEDLINE

## (undated) DEVICE — 3.0MM PRECISION NEURO (MATCH HEAD)

## (undated) DEVICE — DRAPE,REIN 53X77,STERILE: Brand: MEDLINE

## (undated) DEVICE — KT DRN EVAC WND PVC PCH WTROC RND 10F400